# Patient Record
Sex: FEMALE | Race: WHITE
[De-identification: names, ages, dates, MRNs, and addresses within clinical notes are randomized per-mention and may not be internally consistent; named-entity substitution may affect disease eponyms.]

---

## 2020-06-24 ENCOUNTER — HOSPITAL ENCOUNTER (EMERGENCY)
Dept: HOSPITAL 4 - SED | Age: 30
Discharge: HOME | End: 2020-06-24
Payer: MEDICAID

## 2020-06-24 VITALS — BODY MASS INDEX: 45.08 KG/M2 | HEIGHT: 62 IN | WEIGHT: 245 LBS | SYSTOLIC BLOOD PRESSURE: 133 MMHG

## 2020-06-24 VITALS — SYSTOLIC BLOOD PRESSURE: 133 MMHG

## 2020-06-24 DIAGNOSIS — Z20.828: Primary | ICD-10-CM

## 2020-06-24 DIAGNOSIS — J45.909: ICD-10-CM

## 2020-06-24 PROCEDURE — 99283 EMERGENCY DEPT VISIT LOW MDM: CPT

## 2020-06-24 NOTE — NUR
Patient given written and verbal discharge instructions and verbalizes 
understanding.  ER MD discussed with patient the results and treatment 
provided. Patient in stable condition. ID arm band removed.

Rx of Naproxen given. Patient educated on pain management and to follow up with 
PMD. Pain Scale 0.

Opportunity for questions provided and answered. Medication side effect fact 
sheet provided.

## 2020-06-24 NOTE — NUR
Pt came to ER for cough, will be Covid tested and sent home. Pt to be educated 
on Covid criteria and medication. Resting in [facility]

## 2020-07-22 ENCOUNTER — HOSPITAL ENCOUNTER (EMERGENCY)
Dept: HOSPITAL 4 - SED | Age: 30
LOS: 1 days | Discharge: HOME | End: 2020-07-23
Payer: MEDICAID

## 2020-07-22 VITALS — BODY MASS INDEX: 46.01 KG/M2 | HEIGHT: 62 IN | WEIGHT: 250 LBS

## 2020-07-22 VITALS — SYSTOLIC BLOOD PRESSURE: 162 MMHG

## 2020-07-22 DIAGNOSIS — R20.2: ICD-10-CM

## 2020-07-22 DIAGNOSIS — U07.1: Primary | ICD-10-CM

## 2020-07-22 DIAGNOSIS — J45.909: ICD-10-CM

## 2020-07-22 PROCEDURE — 80053 COMPREHEN METABOLIC PANEL: CPT

## 2020-07-22 PROCEDURE — 85007 BL SMEAR W/DIFF WBC COUNT: CPT

## 2020-07-22 PROCEDURE — 83880 ASSAY OF NATRIURETIC PEPTIDE: CPT

## 2020-07-22 PROCEDURE — 86140 C-REACTIVE PROTEIN: CPT

## 2020-07-22 PROCEDURE — 85730 THROMBOPLASTIN TIME PARTIAL: CPT

## 2020-07-22 PROCEDURE — 84484 ASSAY OF TROPONIN QUANT: CPT

## 2020-07-22 PROCEDURE — 83605 ASSAY OF LACTIC ACID: CPT

## 2020-07-22 PROCEDURE — 82728 ASSAY OF FERRITIN: CPT

## 2020-07-22 PROCEDURE — 85610 PROTHROMBIN TIME: CPT

## 2020-07-22 PROCEDURE — 85384 FIBRINOGEN ACTIVITY: CPT

## 2020-07-22 PROCEDURE — 87040 BLOOD CULTURE FOR BACTERIA: CPT

## 2020-07-22 PROCEDURE — 99285 EMERGENCY DEPT VISIT HI MDM: CPT

## 2020-07-22 PROCEDURE — 71045 X-RAY EXAM CHEST 1 VIEW: CPT

## 2020-07-22 PROCEDURE — 36415 COLL VENOUS BLD VENIPUNCTURE: CPT

## 2020-07-22 PROCEDURE — 93005 ELECTROCARDIOGRAM TRACING: CPT

## 2020-07-22 PROCEDURE — 82550 ASSAY OF CK (CPK): CPT

## 2020-07-22 PROCEDURE — 85027 COMPLETE CBC AUTOMATED: CPT

## 2020-07-22 PROCEDURE — 83615 LACTATE (LD) (LDH) ENZYME: CPT

## 2020-07-22 PROCEDURE — 84702 CHORIONIC GONADOTROPIN TEST: CPT

## 2020-07-23 VITALS — SYSTOLIC BLOOD PRESSURE: 162 MMHG

## 2020-07-23 LAB
ALBUMIN SERPL BCP-MCNC: 3.4 G/DL (ref 3.4–4.8)
ALT SERPL W P-5'-P-CCNC: 33 U/L (ref 12–78)
ANION GAP SERPL CALCULATED.3IONS-SCNC: 9 MMOL/L (ref 5–15)
AST SERPL W P-5'-P-CCNC: 13 U/L (ref 10–37)
BASOPHILS NFR BLD MANUAL: 0 % (ref 0–2)
BILIRUB SERPL-MCNC: 0.7 MG/DL (ref 0–1)
BUN SERPL-MCNC: 15 MG/DL (ref 8–21)
CALCIUM SERPL-MCNC: 8.9 MG/DL (ref 8.4–11)
CHLORIDE SERPL-SCNC: 104 MMOL/L (ref 98–107)
CREAT SERPL-MCNC: 0.89 MG/DL (ref 0.55–1.3)
CRP SERPL-MCNC: 1.7 MG/DL (ref 0–0.5)
EOSINOPHIL NFR BLD MANUAL: 1 % (ref 0–7)
ERYTHROCYTE [DISTWIDTH] IN BLOOD BY AUTOMATED COUNT: 14.2 % (ref 9–15)
FIBRINOGEN PPP-MCNC: 295 MG/DL (ref 200–400)
GFR SERPL CREATININE-BSD FRML MDRD: 97 ML/MIN (ref 90–?)
GLUCOSE SERPL-MCNC: 95 MG/DL (ref 70–99)
HCG SERPL-ACNC: 0 MIU/ML (ref 0–6)
HCT VFR BLD AUTO: 43.7 % (ref 36–48)
HGB BLD-MCNC: 14.6 G/DL (ref 12–16)
INR PPP: 1 (ref 0.8–1.2)
LACTATE SERPL-SCNC: 144 U/L (ref 81–234)
LYMPHOCYTES NFR BLD MANUAL: 24 % (ref 20–46)
MCH RBC QN AUTO: 30 PG (ref 27–31)
MCHC RBC AUTO-ENTMCNC: 34 % (ref 32–36)
MCV RBC AUTO: 90 FL (ref 79–98)
MONOCYTES # BLD MANUAL: 9 % (ref 0–11)
NEUTS BAND NFR BLD MANUAL: 0 % (ref 0–6)
PLATELET # BLD AUTO: 243 K/UL (ref 130–430)
POTASSIUM SERPL-SCNC: 3.9 MMOL/L (ref 3.5–5.1)
PROTHROMBIN TIME: 9.9 SECS (ref 9.5–12.5)
RBC # BLD AUTO: 4.87 MIL/UL (ref 4.2–6.2)
SODIUM SERPLBLD-SCNC: 141 MMOL/L (ref 136–145)
VARIANT LYMPHS NFR BLD MANUAL: 0 % (ref 0–0)
WBC # BLD AUTO: 11.2 K/UL (ref 4.8–10.8)

## 2020-07-23 NOTE — NUR
Patient given written and verbal discharge instructions and verbalizes 
understanding.  ER MD discussed with patient the results and treatment 
provided. Patient in stable condition. ID arm band removed.

Opportunity for questions provided and answered.

## 2020-07-23 NOTE — NUR
Pt presents to the ER c/o numbness of her left upper extremity and hand as well 
as of lateral side of her left face x today. Pt also c/o intermittent SOB, 
cough, lightheadedness, wheezing, diarrhea, anxiety, loss of sense of taste.  
Recently dx COVID-19 disease.   Denies fever.

## 2020-07-28 NOTE — NUR
Patient was called by [facility] and notified her CoVID-19 "Detected" result and instructed to 
follow the provided Home Isolation Instructions. Patient verbalizes understanding.

## 2021-05-05 ENCOUNTER — HOSPITAL ENCOUNTER (EMERGENCY)
Dept: HOSPITAL 4 - SED | Age: 31
Discharge: HOME | End: 2021-05-05
Payer: SELF-PAY

## 2021-05-05 VITALS — BODY MASS INDEX: 45.82 KG/M2 | HEIGHT: 62 IN | WEIGHT: 249 LBS

## 2021-05-05 VITALS — SYSTOLIC BLOOD PRESSURE: 158 MMHG

## 2021-05-05 VITALS — SYSTOLIC BLOOD PRESSURE: 148 MMHG

## 2021-05-05 DIAGNOSIS — E78.5: ICD-10-CM

## 2021-05-05 DIAGNOSIS — M79.672: Primary | ICD-10-CM

## 2021-05-05 DIAGNOSIS — J45.909: ICD-10-CM

## 2021-07-03 ENCOUNTER — HOSPITAL ENCOUNTER (EMERGENCY)
Dept: HOSPITAL 4 - SED | Age: 31
Discharge: HOME | End: 2021-07-03
Payer: MEDICAID

## 2021-07-03 VITALS — WEIGHT: 247 LBS | BODY MASS INDEX: 38.77 KG/M2 | HEIGHT: 67 IN

## 2021-07-03 VITALS — SYSTOLIC BLOOD PRESSURE: 14 MMHG

## 2021-07-03 VITALS — SYSTOLIC BLOOD PRESSURE: 124 MMHG

## 2021-07-03 DIAGNOSIS — R07.89: ICD-10-CM

## 2021-07-03 DIAGNOSIS — H81.399: Primary | ICD-10-CM

## 2021-07-03 DIAGNOSIS — Z79.899: ICD-10-CM

## 2021-07-03 DIAGNOSIS — J45.909: ICD-10-CM

## 2021-07-03 LAB
ALBUMIN SERPL BCP-MCNC: 3.6 G/DL (ref 3.4–4.8)
ALT SERPL W P-5'-P-CCNC: 27 U/L (ref 12–78)
ANION GAP SERPL CALCULATED.3IONS-SCNC: 13 MMOL/L (ref 5–15)
AST SERPL W P-5'-P-CCNC: 10 U/L (ref 10–37)
BASOPHILS # BLD AUTO: 0.1 K/UL (ref 0–0.2)
BASOPHILS NFR BLD AUTO: 0.8 % (ref 0–2)
BILIRUB SERPL-MCNC: 0.5 MG/DL (ref 0–1)
BUN SERPL-MCNC: 17 MG/DL (ref 8–21)
CALCIUM SERPL-MCNC: 8.7 MG/DL (ref 8.4–11)
CHLORIDE SERPL-SCNC: 105 MMOL/L (ref 98–107)
CREAT SERPL-MCNC: 0.74 MG/DL (ref 0.55–1.3)
EOSINOPHIL # BLD AUTO: 0.2 K/UL (ref 0–0.4)
EOSINOPHIL NFR BLD AUTO: 1.9 % (ref 0–4)
ERYTHROCYTE [DISTWIDTH] IN BLOOD BY AUTOMATED COUNT: 12.9 % (ref 9–15)
GFR SERPL CREATININE-BSD FRML MDRD: 119 ML/MIN (ref 90–?)
GLUCOSE SERPL-MCNC: 110 MG/DL (ref 70–99)
HCT VFR BLD AUTO: 40.7 % (ref 36–48)
HGB BLD-MCNC: 13.7 G/DL (ref 12–16)
LYMPHOCYTES # BLD AUTO: 3 K/UL (ref 1–5.5)
LYMPHOCYTES NFR BLD AUTO: 28.9 % (ref 20.5–51.5)
MCH RBC QN AUTO: 30 PG (ref 27–31)
MCHC RBC AUTO-ENTMCNC: 34 % (ref 32–36)
MCV RBC AUTO: 89 FL (ref 79–98)
MONOCYTES # BLD MANUAL: 0.7 K/UL (ref 0–1)
MONOCYTES # BLD MANUAL: 7.1 % (ref 1.7–9.3)
NEUTROPHILS # BLD AUTO: 6.4 K/UL (ref 1.8–7.7)
NEUTROPHILS NFR BLD AUTO: 61.3 % (ref 40–70)
PLATELET # BLD AUTO: 282 K/UL (ref 130–430)
POTASSIUM SERPL-SCNC: 3.4 MMOL/L (ref 3.5–5.1)
RBC # BLD AUTO: 4.6 MIL/UL (ref 4.2–6.2)
SODIUM SERPLBLD-SCNC: 142 MMOL/L (ref 136–145)
WBC # BLD AUTO: 10.5 K/UL (ref 4.8–10.8)

## 2021-07-03 PROCEDURE — 71045 X-RAY EXAM CHEST 1 VIEW: CPT

## 2021-07-03 PROCEDURE — 36415 COLL VENOUS BLD VENIPUNCTURE: CPT

## 2021-07-03 PROCEDURE — 84484 ASSAY OF TROPONIN QUANT: CPT

## 2021-07-03 PROCEDURE — 93005 ELECTROCARDIOGRAM TRACING: CPT

## 2021-07-03 PROCEDURE — 85025 COMPLETE CBC W/AUTO DIFF WBC: CPT

## 2021-07-03 PROCEDURE — 99285 EMERGENCY DEPT VISIT HI MDM: CPT

## 2021-07-03 PROCEDURE — 80053 COMPREHEN METABOLIC PANEL: CPT

## 2021-07-03 NOTE — NUR
Pt walked into ED from home c/o chest pain x1 hour, +dizziness and fatigue x1 
week. Denies abd pain, N/V, HA.

## 2021-07-03 NOTE — NUR
Patient given written and verbal discharge instructions and verbalizes 
understanding.  ER MD discussed with patient the results and treatment 
provided. Patient in stable condition. ID arm band removed. Rx of TYLENOL, 
MECLIZINE given. Patient educated on pain management and to follow up with PMD. 
Pain Scale 0/10. Opportunity for questions provided and answered. Medication 
side effect fact sheet provided.

## 2021-07-03 NOTE — NUR
Placed in room 8  . Placed on cardiac monitor, blood pressure machine and pulse 
oximeter. To gown for exam. Side rails up.

Report given to Nirmal MATHEWS.

## 2021-07-03 NOTE — NUR
Patient triaged and placed in waiting room. VSS and patient appears in no acute 
distress at this time. Accompanied by FAM MEMBER, awaiting available bed, and 
MD notified of need for MSE.

## 2023-03-15 ENCOUNTER — HOSPITAL ENCOUNTER (EMERGENCY)
Dept: HOSPITAL 4 - SED | Age: 33
Discharge: HOME | End: 2023-03-15
Payer: COMMERCIAL

## 2023-03-15 VITALS — WEIGHT: 250 LBS | HEIGHT: 62 IN | BODY MASS INDEX: 46.01 KG/M2

## 2023-03-15 VITALS — SYSTOLIC BLOOD PRESSURE: 147 MMHG

## 2023-03-15 DIAGNOSIS — Z79.899: ICD-10-CM

## 2023-03-15 DIAGNOSIS — E78.5: ICD-10-CM

## 2023-03-15 DIAGNOSIS — J06.9: ICD-10-CM

## 2023-03-15 DIAGNOSIS — R06.02: ICD-10-CM

## 2023-03-15 DIAGNOSIS — J45.909: ICD-10-CM

## 2023-03-15 DIAGNOSIS — R09.81: ICD-10-CM

## 2023-03-15 DIAGNOSIS — U07.1: Primary | ICD-10-CM

## 2023-03-15 DIAGNOSIS — R05.9: ICD-10-CM

## 2023-03-15 NOTE — NUR
Patient given written and verbal discharge instructions and verbalizes 
understanding.  ER DR. NORRIS discussed with patient the results and treatment 
provided. Patient in stable condition. ID arm band removed. 

Rx of prednisone, albuterol and benzonatate given. Patient educated on pain 
management and to follow up with PMD. Pain Scale 0.

Opportunity for questions provided and answered. Medication side effect fact 
sheet provided.

## 2023-03-15 NOTE — NUR
PT FROM HOME WITH C/O OF "NOT FEELING WELL." PT STATES SHE TESTED POSITIVE FOR 
COVID ON SUNDAY. PT STATES SHE FEELS SOB. PT WITH HX OF ASTHMA. DBP OF 99, MD 
MADE AWARE AND REQUESTED FOR MSE.